# Patient Record
Sex: MALE | URBAN - METROPOLITAN AREA
[De-identification: names, ages, dates, MRNs, and addresses within clinical notes are randomized per-mention and may not be internally consistent; named-entity substitution may affect disease eponyms.]

---

## 2024-01-17 PROBLEM — Z00.00 ENCOUNTER FOR PREVENTIVE HEALTH EXAMINATION: Status: ACTIVE | Noted: 2024-01-17

## 2024-01-19 ENCOUNTER — OUTPATIENT (OUTPATIENT)
Dept: OUTPATIENT SERVICES | Facility: HOSPITAL | Age: 74
LOS: 1 days | Discharge: HOME | End: 2024-01-19
Payer: MEDICARE

## 2024-01-19 DIAGNOSIS — C25.9 MALIGNANT NEOPLASM OF PANCREAS, UNSPECIFIED: ICD-10-CM

## 2024-01-25 ENCOUNTER — APPOINTMENT (OUTPATIENT)
Dept: HEMATOLOGY ONCOLOGY | Facility: CLINIC | Age: 74
End: 2024-01-25
Payer: MEDICARE

## 2024-01-25 PROCEDURE — 99205 OFFICE O/P NEW HI 60 MIN: CPT

## 2024-01-25 NOTE — ASSESSMENT
[FreeTextEntry1] : 74 y/o man with metastatic relapse of what was originally an apparently localized pancreatic cancer, but then he developed local and then progressive metastatic relapse. He has been exposed to both FOLFIRINOX and GnP therapy with progression and presented for consideration of additional therapy. The family had several questions about pursuing locally directed treatment such as radiation, ablation, and other techniques, but given systemic progression is apparent, I dedicated more time to discussion of systemic therapy options. Given his decline in performance status and progression on standard therapies, I reviewed the molecular data which show both MTAP loss and a HER3 amplification, which are both eligible therapies for clinical trials; the former, a PRMT5 inhibitor which I believe is open in the [facility] phase 1 group (we are opening that same trial but not until ~March) and the TAPUR trial which is enrolling HER3 amplifications (after adjudication from the central review committee). I explained that if he would like to be considered for the TAPUR trial here then I would like to arrange an in-person visit because my assessment of his performance status by video is limited. I suspect he would not likely qualify for the AM[facility] PRMT5 inhibitor trial requiring ECOG 0-1 performance status. As for administration of local therapy, such as ablation, I suggested discussing this option with his oncologist and a referral to interventional radiologiy or radiation oncology but that it was my impression that the great burden of disease is systemic, our proceduralist experts may defer. I also discussed the option of hospice as a way to maximize/preserve quality of life but the patient's children expressed resistance to the idea, instead asking for additional therapeutic options. I offered them a return in-person visit for further consideraeiton of TAPUR clinical trial and they are welcome to return at any time. Harvey Buchanan MD PhD Medical Oncology Attending I personally have spent a total of 60 minutes of time on the date of this encounter reviewing test results, documenting findings, coordinating care, and directly consulting with the patient and/or designated family member.

## 2024-01-25 NOTE — HISTORY OF PRESENT ILLNESS
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 Margins negative.  pT2N1 04/16/23 picc placed 04/23/23 CT CAP showed mass-like structure near pancreas tail 2.5 cm decreased in size but no present on MRI 3/6/23; and fluid collection, thought complicated fluid collection, unchanged scattered pulmonary nodules 05/30/23 C1 adjuvant FOLFIRINOX 06/23/23 CT CAP mesenteric desnity, ileitis, colitis <> C3 FOLFOX (irinotecan dropped for colitis) 09/12/23 CT CAP showed 2.4 cm mass post-operative bed c/w local recurrence; pancreatic tail mass 2.4 cm thought possibly complex fluid collection, omental nodule worrisome for peritoneal carcinomatosis, increase in mediastinal LN 10/02/23 C1 GnP 11/02/23 CT CPA showed locally recurrence disease with tethering of small and large bowel and SBO, peritoneal carcinomatosis 11/10/23 stent insertion  01/09/24 GnP (last dose); bilirubin elevating 01/20/24 last CT scan, showed growth of disease; 3.5L paracentesis; planning further evaluation for biliary stent 01/23/24 due for chemotherapy 01/25/24 scheduled for hydration    MMR status (all GI cancers): Tumor Mutation Data (if advanced): Germline Data (if pancreas or young): Signatera/ctDNA testing:  HER2 (if eso/gastric): PD-L1 (if eso/gastric):   PMHx (those that will influence cancer management plan): Social Hx (including place of residency, job, hobby): Fam Hx (cas GI cancers):  Additional Quality Metrics: Goals of Care discussion: Edita considered: Palliative care referral considered: [de-identified] : 1/25 - pain in AM, is on hydromorphone; is taking pretty much around the clot - takes ondansetron PRN, and lorazepam - takes Creon - trying appetite stimulants, losing weight - activity level is low, trying to walk, spends most of time in chair/recliner

## 2024-01-26 ENCOUNTER — RESULT REVIEW (OUTPATIENT)
Age: 74
End: 2024-01-26

## 2024-01-26 PROCEDURE — 88321 CONSLTJ&REPRT SLD PREP ELSWR: CPT

## 2024-01-30 ENCOUNTER — NON-APPOINTMENT (OUTPATIENT)
Age: 74
End: 2024-01-30

## 2024-01-30 ENCOUNTER — APPOINTMENT (OUTPATIENT)
Dept: HEMATOLOGY ONCOLOGY | Facility: CLINIC | Age: 74
End: 2024-01-30
Payer: MEDICARE

## 2024-01-30 ENCOUNTER — RESULT REVIEW (OUTPATIENT)
Age: 74
End: 2024-01-30

## 2024-01-30 VITALS
SYSTOLIC BLOOD PRESSURE: 107 MMHG | HEART RATE: 102 BPM | WEIGHT: 142.64 LBS | OXYGEN SATURATION: 98 % | DIASTOLIC BLOOD PRESSURE: 71 MMHG | TEMPERATURE: 98.1 F | HEIGHT: 66.85 IN | BODY MASS INDEX: 22.39 KG/M2 | RESPIRATION RATE: 18 BRPM

## 2024-01-30 DIAGNOSIS — C25.9 MALIGNANT NEOPLASM OF PANCREAS, UNSPECIFIED: ICD-10-CM

## 2024-01-30 DIAGNOSIS — R78.81 BACTEREMIA: ICD-10-CM

## 2024-01-30 LAB
BASOPHILS # BLD AUTO: 0.02 K/UL — SIGNIFICANT CHANGE UP (ref 0–0.2)
BASOPHILS NFR BLD AUTO: 0.2 % — SIGNIFICANT CHANGE UP (ref 0–2)
EOSINOPHIL # BLD AUTO: 0.03 K/UL — SIGNIFICANT CHANGE UP (ref 0–0.5)
EOSINOPHIL NFR BLD AUTO: 0.3 % — SIGNIFICANT CHANGE UP (ref 0–6)
HCT VFR BLD CALC: 32.4 % — LOW (ref 39–50)
HGB BLD-MCNC: 10.9 G/DL — LOW (ref 13–17)
IMM GRANULOCYTES NFR BLD AUTO: 0.5 % — SIGNIFICANT CHANGE UP (ref 0–0.9)
LYMPHOCYTES # BLD AUTO: 0.63 K/UL — LOW (ref 1–3.3)
LYMPHOCYTES # BLD AUTO: 5.4 % — LOW (ref 13–44)
MCHC RBC-ENTMCNC: 28.9 PG — SIGNIFICANT CHANGE UP (ref 27–34)
MCHC RBC-ENTMCNC: 33.6 G/DL — SIGNIFICANT CHANGE UP (ref 32–36)
MCV RBC AUTO: 85.9 FL — SIGNIFICANT CHANGE UP (ref 80–100)
MONOCYTES # BLD AUTO: 0.83 K/UL — SIGNIFICANT CHANGE UP (ref 0–0.9)
MONOCYTES NFR BLD AUTO: 7.1 % — SIGNIFICANT CHANGE UP (ref 2–14)
NEUTROPHILS # BLD AUTO: 10.09 K/UL — HIGH (ref 1.8–7.4)
NEUTROPHILS NFR BLD AUTO: 86.5 % — HIGH (ref 43–77)
NRBC # BLD: 0 /100 WBCS — SIGNIFICANT CHANGE UP (ref 0–0)
PLATELET # BLD AUTO: 369 K/UL — SIGNIFICANT CHANGE UP (ref 150–400)
RBC # BLD: 3.77 M/UL — LOW (ref 4.2–5.8)
RBC # FLD: 20.9 % — HIGH (ref 10.3–14.5)
SURGICAL PATHOLOGY STUDY: SIGNIFICANT CHANGE UP
WBC # BLD: 11.66 K/UL — HIGH (ref 3.8–10.5)
WBC # FLD AUTO: 11.66 K/UL — HIGH (ref 3.8–10.5)

## 2024-01-30 PROCEDURE — 99215 OFFICE O/P EST HI 40 MIN: CPT

## 2024-01-30 RX ORDER — LEVOFLOXACIN 750 MG/1
750 TABLET, FILM COATED ORAL DAILY
Qty: 7 | Refills: 0 | Status: ACTIVE | COMMUNITY
Start: 2024-01-30 | End: 1900-01-01

## 2024-01-30 NOTE — ASSESSMENT
[FreeTextEntry1] : 72 y/o man with metastatic relapse of what was originally an apparently localized pancreatic cancer, but then he developed local and then progressive metastatic relapse. He has been exposed to both FOLFIRINOX and GnP therapy with progression and presented for consideration of additional therapy. The family had several questions about pursuing locally directed treatment such as radiation, ablation, and other techniques, but given systemic progression is apparent, I dedicated more time to discussion of systemic therapy options. Given his decline in performance status and progression on standard therapies, I reviewed the molecular data which show both MTAP loss and a HER3 amplification, which are both eligible therapies for clinical trials; the former, a PRMT5 inhibitor which I believe is open in the [facility] phase 1 group (we are opening that same trial but not until ~March) and the TAPUR trial which is enrolling HER3 amplifications (after adjudication from the central review committee). I explained during the initial encounter that if he would like to be considered for the TAPUR trial here then I would like to arrange an in-person visit because my assessment of his performance status by video is limited. I suspect he would not likely qualify for the AMG PRMT5 inhibitor trial requiring ECOG 0-1 performance status. As for administration of local therapy, such as ablation, I suggested discussing this option with his oncologist and a referral to interventional radiologiy or radiation oncology but that it was my impression that the great burden of disease is systemic, our proceduralist experts may defer. The option of hospice was discussed as a way to maximize/preserve quality of life but the patient's children expressed resistance to the idea, instead asking for additional therapeutic options.   Patient is currently on [place] abraxane at [facility] (started on 10/2/23 and last dose was 2 weeks). He was supposed to get it on 1/29 but he didn't go as he wasn't feeling well.   1/30/24 Of note, he went to [facility] on 1/29 as he was lethargic and confused that day. He got a dose of antibiotics and also got blood cultures drawn. He said he got a call that 1/4 blood cultures grew aerobic G-ve rods on prelim result. He hasn't had fever yet. He was told by ER at [place] to follow up if he spikes fever. Given that he reportedly has G-ve rods in blood culture, we prescribed a 7 day course of levaquin at first. However, after speaking with Dr. Martines who confirmed the positive blood culture, patient was sent by them to [facility] ER for further evaluation of antibiotic needs. They will update us by portal.   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  He has an ERBB3 amplification and the TAPUR arm does have a treatment available, using trastuzuamb/pertuzumab. That said, given the report of positive blood cultures, we would need to ensure he has thorough assessment and likely a course of antibiotics. Should he remain afebrile after abx, retains performance status, and the TAPUR team notify us that the ERBB3 amplification arm is still available, then we could consider the TAPUR trial for him.  Plan: -  We ordered CBC with diff, CMP, LDH, CA 19-9, CEA, Mg, Phos and repeat blood cultures. - TAPUR trial part 1 screening  Case discussed with Dr. Buchanan.  Aster Newsome PGY 5

## 2024-01-30 NOTE — HISTORY OF PRESENT ILLNESS
[6 - Distress Level] : Distress Level: 6 [ECOG Performance Status: 2 - Ambulatory and capable of all self care but unable to carry out any work activities] : Performance Status: 2 - Ambulatory and capable of all self care but unable to carry out any work activities. Up and about more than 50% of waking hours [de-identified] : ID: 74 y/o man with metastatic relapse of what was originally an apparently localized pancreatic cancer, but then he developed local and then progressive metastatic relapse. He has received FOLFIRINOX and [place] abraxane and has progressed through them.  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 Margins negative.  pT2N1 04/16/23 picc placed 04/23/23 CT CAP showed mass-like structure near pancreas tail 2.5 cm decreased in size but no present on MRI 3/6/23; and fluid collection, thought complicated fluid collection, unchanged scattered pulmonary nodules 05/30/23 C1 adjuvant FOLFIRINOX 06/23/23 CT CAP mesenteric desnity, ileitis, colitis <> C3 FOLFOX (irinotecan dropped for colitis) 09/12/23 CT CAP showed 2.4 cm mass post-operative bed c/w local recurrence; pancreatic tail mass 2.4 cm thought possibly complex fluid collection, omental nodule worrisome for peritoneal carcinomatosis, increase in mediastinal LN 10/02/23 C1 [place] abrexane 11/02/23 CT CPA showed locally recurrence disease with tethering of small and large bowel and SBO, peritoneal carcinomatosis 11/10/23 stent insertion  01/09/24 [place] abraxane (last dose); bilirubin elevating 01/20/24 last CT scan, showed growth of disease; 3.5L paracentesis; planning further evaluation for biliary stent 01/23/24 due for chemotherapy 01/25/24 scheduled for hydration 01/29/24- due for chemo but didn't go 01/29/24 went to [facility] ED for confusion, received a dose of antibiotics   MMR status (all GI cancers): Tumor Mutation Data (if advanced): ERBB2 +ve Germline Data (if pancreas or young): Signatera/ctDNA testing:  HER2 (if eso/gastric): PD-L1 (if eso/gastric):   PMHx (those that will influence cancer management plan): Social Hx (including place of residency, job, hobby): Fam Hx (cas GI cancers):  Additional Quality Metrics: Goals of Care discussion: Edita considered: Palliative care referral considered: patient already seeing palliative team at [facility] [de-identified] : 1/30 - pain in AM, is on hydromorphone, fentanyl - takes ondansetron PRN, and lorazepam - takes Creon - trying appetite stimulants, losing weight    1/30 - He reports abdominal pain/bloating with pain scale 4-5/10. He had 8 mg of dilaudid today. His pain would go up to 6-7/10 if there is a delay in taking dilaudid. - He has fentanyl patch for long acting pain control and usually takes dilaudid 16 mg total per day, usually with q4-6h frequency. He sees pain management physician at [facility]. - patient reports that he's in bed or chair most of  the day and has a hard time showering by himself. 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 - patient has also ost at least 20lbs over the past 3 months. And because of the weight loss, he's had some skin changes due to pressure around coccygeal area.

## 2024-01-30 NOTE — END OF VISIT
[] : Fellow [FreeTextEntry3] : Harvey Buchanan MD PhD Medical Oncology Attending I personally have spent a total of 45 minutes of time on the date of this encounter reviewing test results, documenting findings, coordinating care, and directly consulting with the patient and/or designated family member. I was present with the trainee during the key portions of the history and exam. I agree with the findings and plan as documented above.

## 2024-01-30 NOTE — PHYSICAL EXAM
[Thin] : thin [Normal] : grossly intact [Ambulatory and capable of all self care but unable to carry out any work activities] : Status 2- Ambulatory and capable of all self care but unable to carry out any work activities. Up and about more than 50% of waking hours [Ulcers] : no ulcers [Mucositis] : no mucositis [Thrush] : no thrush [de-identified] : dry mouth [de-identified] : mildly distended, [de-identified] : Bilateral LE edema +

## 2024-01-30 NOTE — REVIEW OF SYSTEMS
[Abdominal Pain] : abdominal pain [Anxiety] : anxiety [Depression] : depression [Negative] : Musculoskeletal [Dysphagia] : no dysphagia [Loss of Hearing] : no loss of hearing [Odynophagia] : no odynophagia [Mucosal Pain] : no mucosal pain [Chest Pain] : no chest pain [Shortness Of Breath] : no shortness of breath [Vomiting] : no vomiting [Skin Rash] : no skin rash [Proptosis] : no proptosis [Easy Bleeding] : no tendency for easy bleeding [FreeTextEntry4] : dry mouth [FreeTextEntry7] : loose stool, nausea [de-identified] : decreased concentration

## 2024-02-01 ENCOUNTER — NON-APPOINTMENT (OUTPATIENT)
Age: 74
End: 2024-02-01

## 2024-05-17 LAB
ALBUMIN SERPL ELPH-MCNC: 3 G/DL
ALP BLD-CCNC: 610 U/L
ALT SERPL-CCNC: 73 U/L
ANION GAP SERPL CALC-SCNC: 13 MMOL/L
AST SERPL-CCNC: 129 U/L
BACTERIA BLD CULT: NORMAL
BACTERIA BLD CULT: NORMAL
BILIRUB SERPL-MCNC: 2.4 MG/DL
BUN SERPL-MCNC: 14 MG/DL
CALCIUM SERPL-MCNC: 9.3 MG/DL
CANCER AG19-9 SERPL-ACNC: ABNORMAL U/ML
CEA SERPL-MCNC: 22.6 NG/ML
CHLORIDE SERPL-SCNC: 103 MMOL/L
CO2 SERPL-SCNC: 21 MMOL/L
CREAT SERPL-MCNC: 0.98 MG/DL
EGFR: 82 ML/MIN/1.73M2
GLUCOSE SERPL-MCNC: 126 MG/DL
LDH SERPL-CCNC: 260 U/L
MAGNESIUM SERPL-MCNC: 1.5 MG/DL
PHOSPHATE SERPL-MCNC: 3.2 MG/DL
POTASSIUM SERPL-SCNC: 4.3 MMOL/L
PROT SERPL-MCNC: 6.3 G/DL
SODIUM SERPL-SCNC: 137 MMOL/L